# Patient Record
Sex: FEMALE | URBAN - METROPOLITAN AREA
[De-identification: names, ages, dates, MRNs, and addresses within clinical notes are randomized per-mention and may not be internally consistent; named-entity substitution may affect disease eponyms.]

---

## 2018-08-06 LAB — MAMMOGRAM: NORMAL

## 2019-04-09 ENCOUNTER — TRANSFERRED RECORDS (OUTPATIENT)
Dept: HEALTH INFORMATION MANAGEMENT | Facility: CLINIC | Age: 59
End: 2019-04-09

## 2019-04-24 ENCOUNTER — DOCUMENTATION ONLY (OUTPATIENT)
Dept: CARE COORDINATION | Facility: CLINIC | Age: 59
End: 2019-04-24

## 2019-05-07 NOTE — TELEPHONE ENCOUNTER
FUTURE VISIT INFORMATION      FUTURE VISIT INFORMATION:    Date: 6/24/19    Time: 12:50pm    Location: [facility]  REFERRAL INFORMATION:    Referring provider:  Kyler Kelly DDS    Referring providers clinic:  [facility]    Reason for visit/diagnosis:  Pt has squeezing feeling in her throat. Complains of choking sensations, sand in her throat and has to clear throat by coughing. Addressing myofascial pain through PT and Health psych possibly contributing to her symptoms.    RECORDS REQUESTED FROM:       Clinic name Comments Records Status Imaging Status   [facility] 1/29/19 - Office Visit - Kyler Kelly DDS Received / Epic          [facility] 2/10/16 - Office Visit - Dr. Philip Ramirez Care Everywhere    [facility] - Imaging 3/6/18 - US Head Neck Soft Tissue  1/6/16 - US Head Neck Soft Tissue  10/22/14 - US Head Neck Soft Tissue Care Everywhere PACS   [facility] - Imaging 7/23/18 - XR Chest  5/1/17 - XR Chest  11/11/15 CT Temporal bone   Care Everywhere req 6/18 - in PACS 6/19 5/7/19 10:50am - Called [facility] and requested records (appt notes, last therapy notes).  They will fax the recs they have to us.  PP  5/7/19 11:08am - Called pt and LVM inquiring about out side recs. Left call back number.  PP  5/7/19 11:11am - Faxed imaging request to [facility].  PP  5/7/19 11:13am - Faxed imaging request to [facility].  PP  5/7/19 12:10pm - Mili received phone call from [facility] - they did not do CT Neck imaging there.  PP  5/7/19 4:50pm - Received fax of recs from [facility]. Faxed to [facility] for chart.  PP  5/14/19 9:41am - Received imaging report from [facility] via fax and resolved imaging in PACS.  PP  5/14/19 9:50am - Faxed imaging request for chest images to [facility].  PP  6/18/19 1:46PM sent a request to [facility] for images - [facility]   6/19/19 6:53PM received image from [facility] in PACS - [facility]

## 2019-06-24 ENCOUNTER — PRE VISIT (OUTPATIENT)
Dept: OTOLARYNGOLOGY | Facility: CLINIC | Age: 59
End: 2019-06-24

## 2019-06-24 ENCOUNTER — OFFICE VISIT (OUTPATIENT)
Dept: OTOLARYNGOLOGY | Facility: CLINIC | Age: 59
End: 2019-06-24
Payer: COMMERCIAL

## 2019-06-24 DIAGNOSIS — R05.3 CHRONIC COUGH: ICD-10-CM

## 2019-06-24 DIAGNOSIS — R09.A2 GLOBUS SENSATION: Primary | ICD-10-CM

## 2019-06-24 DIAGNOSIS — R49.0 DYSPHONIA: ICD-10-CM

## 2019-06-24 DIAGNOSIS — R13.19 OTHER DYSPHAGIA: ICD-10-CM

## 2019-06-24 DIAGNOSIS — R07.0 THROAT DISCOMFORT: ICD-10-CM

## 2019-06-24 DIAGNOSIS — J38.7 IRRITABLE LARYNX SYNDROME: ICD-10-CM

## 2019-06-24 NOTE — LETTER
6/24/2019      RE: Nadia Toro  [address]  [place] [address]       Dear Dr. Kelly:    I had the pleasure of meeting Nadia Toro in consultation at the [facility] of the [facility] at your request, for evaluation of throat irritation. The note from our visit follows. Speech recognition software may have been used in the documentation below; input is reviewed before signature to the best of my ability. I appreciate the opportunity to participate in the care of this pleasant patient.    Please feel free to contact me with any questions.    Sincerely yours,    Dunia Ramos M.D., M.P.H.  , Laryngology  Director, [facility]  Otolaryngology- Head & Neck Surgery  [phone]          =====    History of Present Illness:   Nadia Toro is a pleasant 58-year-old female who presents with a four year history of chronic cough and throat concerns. Symptoms include throat irritation, throat tightness, pain/ache in throat and frequent cough.    The patient describes a sensation of sandpaper in the left throat which is the worst of the problems she is experiencing. It feels burning, sharp, or like something is stuck or her throat on the left side is squeezing. She believes this started when she was having issues with fungal infections in her left ear. Now her ear issues have cleared up, but the throat issue remains.     The sensation causes her to cough when she swallows her saliva or makes the motion of swallowing, but not when eating food or talking. She is now bothered by perfumes and scents which make her cough, and this was not the case prior to this throat issue. She also feels throat tightness and like she has extra mucous in her throat. Rarely she has had issues with food sticking for the first few swallows in the am.  No issues with breathing. Sometimes she feels like her throat clears are not  productive, and she does have a history of GERD in the past. She was seen by an outside ENT and sent to dental for TMJ problems. The exercises from TMJ clinic did somewhat alleviate the sensation in the left throat, but it is still there and is a daily struggle.    The patient also thinks she has a lump on the left side near the angle of her jaw and just medial to this, within the pharynx is where she has the discomfort. This lump has not changed since 2015. She had a CT scan in 2015 while she had the lump, and she understood that it was unremarkable. Of note, she has also been through PT/OT for tension in her neck and back muscles, and reports that she understands that anxiety and stress can be related to a lot of muscle issues.     She notes pain at thyrohyoid space.      Voice  No concerns.       Swallowing  Swallow problem began in October of 2015 in the context of external otitis and myringitis around the same time.  In the past, she has had difficulty with esophageal stasis and GERD but not much lately. Eliminating gluten helped.  Things never feel like they get down the wrong tube.    Food and liquid go down without difficulty, but sometimes with swallowing saliva she feels a tight sensation on the left side of her throat. Sometimes it also feels like there is something sharp in her throat that makes her cough, and at times her left ear feels bothered. feels like sandpaper in her throat.    She does not experience increased swallowing effort with any texture(s).    She was seen by an ENT and referred for TMJ evaluation. She has also been seen for TMJ dysfunction and had physical therapy for myofascial pain of the masticatory and cervical muscles, but her swallowing still feels tight. No prior swallow study. She also did acupuncture as well as chiropractic medicine.      Cough/Throat-clearing  As noted above, she sometimes coughs when she gets throat discomfort on the left side.  The cough is generally worse  in the mornings and does seem to be better over time. No preceding tickle, but perfumes and strong smells also trigger a cough.      Breathing  No concerns.       Throat discomfort  As above.      Reflux-type symptoms  She experiences heartburn/indigestion rarely. She is not taking reflux medications.  No prior Gastroenterology evaluation.      Prior [facility] outside records were reviewed for this visit. She was previously followed with Dr. Osiel Ramirez for ear symptoms. She also saw Dr. Faisal Corrigan for throat symptoms. Notes were not available for review.      MEDICATIONS:     Current Outpatient Medications   Medication Sig Dispense Refill     metroNIDAZOLE (METROCREAM) 0.75 % external cream          ALLERGIES:  No Known Allergies    PAST MEDICAL HISTORY: No past medical history on file.   Endometriosis       Pelvic pain       Non-seasonal allergic rhinitis 4/12/2010     Anxiety 4/12/2010     Hemangioma of liver 4/12/2010     Thyroid nodule 4/12/2010 Followed at [place]   Endometriosis, site unspecified 4/12/2010     Vitamin D deficiency 12/16/2011     Esophageal reflux 12/16/2011           PAST SURGICAL HISTORY: No past surgical history on file.  Surgery Date Site/Laterality Comments   PELVIC LAPAROSCOPY          [facility] TUBAL LIGATION          IL REMOVAL OF OVARY(S)     left     chest wall lipoma excision     left         HABITS/SOCIAL HISTORY:    Social History     Tobacco Use     Smoking status: Not on file   Substance Use Topics     Alcohol use: Not on file     Tobacco Use Types Packs/Day Years Used Date   Never Smoker           Smokeless Tobacco: Never Used           Tobacco Cessation: Counseling Given: Yes     Alcohol Use Drinks/Week oz/Week Comments   Yes     social           FAMILY HISTORY:  No family history on file. Noncontributory.    REVIEW OF SYSTEMS:  The patient completed a comprehensive 11 point review of systems (below), which was reviewed. Positives are as noted below; pertinent findings are as noted in  the HPI.     Patient Supplied Answers to Review of Systems   ENT ROS 6/23/2019   Ears, Nose, Throat Ringing/noise in ears, Trouble swallowing   Musculoskeletal Neck pain       PHYSICAL EXAMINATION:  General: The patient was alert and oriented x 3 and conversant, and in no acute distress, but speaks quickly, has somewhat tangential speech and is obviously anxious.   Eyes: EOM normal, Conjunctiva and lids normal.  Nose: Anterior rhinoscopy: no gross abnormalities. no  bleeding; no  mucopurulence; septum grossly normal, no  mucoid drainage and/or crusting.  Oral cavity/oropharynx: No masses or lesions. Dentition in  good condition. Floor of mouth and oral tongue soft to palpation. Tongue mobility and palate elevation intact and symmetric.  Ears: Normal auricles, external auditory canals bilaterally. Visualized portions of tympanic membranes normal bilaterally.   Neck: No palpable cervical lymphadenopathy. There was moderate tenderness to palpation of the thyrohyoid space, which was narrow. Landmarks are easily palpable. No discrete mass in the area of the left parotid, but some possible increased soft tissue density in that region.  Resp: Breathing comfortably, no stridor or stertor  Neuro: Symmetric facial function. Other cranial nerves as documented above.  Psych: Normal affect, pleasant and cooperative.  Voice/speech:  No dysphonia  Extremities: No cyanosis, clubbing, or edema of the upper extremities.     Intake scores  Total Score for Last Patient-Answered VHI Questionnaire  VHI Total Score 6/23/2019   VHI Total Score 0     Total Score for Last Patient-Answered EAT Questionnaire  EAT Total Score 6/23/2019   EAT Total Score 0       Total Score for Last Patient-Answered CSI Questionnaire  CSI Total Score 6/23/2019   CSI Total Score 0       PROCEDURE:   Flexible fiberoptic laryngoscopy  Indications: This procedure was warranted to evaluate the patient's laryngeal anatomy and function. Risks, benefits, and  "alternatives were discussed.  Description: After written informed consent was obtained, a time-out was performed to confirm patient identity, procedure, and procedure site. Topical 3% lidocaine with 0.25% phenylephrine was applied to the nasal cavities. I performed the endoscopy and no complications were apparent.  Performed by: Dunia Ramos MD MPH  SLP: Everton Martines MM, MA, CCC-SLP   Findings: Normal nasopharynx. Normal base of tongue, valleculae, and epiglottis. The right true vocal fold demonstrated normal mobility. The left true vocal fold demonstrated normal mobility. The medial edges of the vocal folds appeared smooth and straight. No focal mucosal lesions were observed on the true vocal folds. Glissade produced appropriate elongation.  Mucosa of the false vocal folds, aryepiglottic folds, piriform sinuses, and posterior glottis unremarkable. Airway was patent. Response to the therapy probes was good. No lesions on NBI.    LABS:  None    IMAGING/RESULTS reviewed, with pertinent report excerpts below:  [facility] CT neck with contrast from 2015 not available to view but copies of the report reads:  \"mucosal surfaces of the upper aerodigestive tract are symmetrical and unremarkable, without discrete mass. The oral cavity and floor of mouth structures are symmetrical and unremarkable. No parapharyngeal inflammation or infiltration. The parotid and submandibular glands are symmetrical and unremarkable. Small bilateral cervical lymph nodes are noted; none are pathologic by size or morphology.\"       IMPRESSION AND PLAN:   Nadia Toro presents with hypersensitivity of the upper aerodigestive tract without any focal exam abnormalities.    Discussion:  1) We discussed CT scan to further investigate the deep tissues of her neck. She previously had a CT in 2015 (which was indicated for the same symptoms that she comes in for today) for which she had copies of the reads, and these describe no " mass or abnormalities in the area. She reports that her symptoms have not changed much sine then, but we did discuss consideration of a repeat study given her ongoing concern about a possible mass in the area of her left parotid gland. She will think about this and let us know if she would like to proceed.    2) We also discussed the use of neuromodulatory medications and their side effect profiles. She is not interested in these medications at this time because of potential sedation.     3) We discussed speech therapy to help with muscle tension and cough suppression. She would like to proceed with this. She has previously found physical therapy/myofascial release helpful and therefore feels hopeful about this option.    4) We also discussed that we did not evaluate her esophagus today and esophagoscopy would be reasonable to consider if her symptoms persist or worsen. She has already been seen at [facility] (but no prior upper endoscopy) so no additional referral was given, but can be provided if that would be helpful.    She will return as needed. I appreciate the opportunity to participate in the care of this patient.       Seen with:   Afsaneh Carrero MD  [facility]    Physician Attestation   I, Dunia Ramos, saw this patient and agree with the findings and plan of care as documented in the edited note above.      Items personally reviewed/procedural attestation: I was present for and supervised the entire laryngoscopy procedure.    Dunia Ramos MD

## 2019-06-24 NOTE — PROGRESS NOTES
"[facility]  Evaluation report    Clinician: Everton Martines M.M., M.A., CCC/SLP  Seen in conjunction with: Dr. Ramos  Referring physician:  Dr. Kelly  Patient: Nadia Toro  Date of Visit: 6/24/2019    HISTORY  Chief complaint: Nadia Toro is a 58 year old woman presenting today for evaluation of swallowing, cough, and throat discomfort.    Onset: Suddenly in the fall of 2015  Inciting incident: unclear but it began subsequent to an ear infection  Course: Stable  Salient history: She has a history significant for thyroid nodules, TMD.  Was seen by an outside ENT who felt that her symptoms were related to TMD, and referred her to the TMD clinic here at the .  Her she reports trying to avoid medications and possible and she feels that her work with the TMD clinic, physical therapy, and acupuncture have been helpful for reducing the globus sensation; however, it still continues to persist to some degree and this \"drives her crazy\".    CURRENT SYMPTOMS INCLUDE    COUGH/THROAT CLEARING    Worse in the mornings    Associates this tight feeling in her left neck area and possible reflux    Feels as if something is blocked on the left side of her throat and she must cough in order to clear it    Cough is intermittently productive with small amounts of mucus coming up with significant cough    her cough/ throat clearing triggers include:  o Perfumes and strong smells    SWALLOWING    She notes she has had occasional issues with sensation of solid food sticking in her esophagus, but this is not a major concern    When she swallows her saliva she describes a \"squeezing tight feeling on the left side of her throat\" and intermittent sensation of a choking feeling at various times throughout her day    She notes that for the past 3 months she has had a sensation of saliva feeling like it \"goes down the wrong tube\" with resultant cough    ADDITIONAL    THROAT DISCOMFORT    Sensation of something sharp " "stuck in her throat    Sensation of numbness of the back of her tongue    Tongue movement will result in a tickly feeling in her left ear    Patient denies significant dyspnea and dysphonia.     OTHER PERTINENT HISTORY    Otherwise unknown.  Please also refer to Dr. Ramos's dictation.     No past medical history on file.  No past surgical history on file.    OBJECTIVE  PATIENT REPORTED MEASURES  Patient Supplied Answers To VHI Questionnaire  Voice Handicap Index (VHI-10) 6/23/2019   My voice makes it difficult for people to hear me 0   People have difficulty understanding me in a noisy room 0   My voice difficulties restrict my personal and social life.  0   I feel left out of conversations because of my voice 0   My voice problem causes me to lose income 0   I feel as though I have to strain to produce voice 0   The clarity of my voice is unpredictable 0   My voice problem upsets me 0   My voice makes me feel handicapped 0   People ask, \"What's wrong with your voice?\" 0   VHI-10 0     Patient Supplied Answers To CSI Questionnaire  Cough Severity Index (CSI) 6/23/2019   My cough is worse when I lie down 0   My coughing problem causes me to restrict my personal and social life 0   I tend to avoid places because of my cough problem 0   I feel embarrassed because of my coughing problem 0   People ask, ''What's wrong?'' because I cough a lot 0   I run out of air when I cough 0   My coughing problem affects my voice 0   My coughing problem limits my physical activity 0   My coughing problem upsets me 0   People ask me if I am sick because I cough a lot 0   CSI Score 0     Patient Supplied Answers To EAT Questionnaire  Eating Assessment Tool (EAT-10) 6/23/2019   My swallowing problem has caused me to lose weight 0   My swallowing problem interferes with my ability to go out for meals 0   Swallowing liquids takes extra effort 0   Swallowing solids takes extra effort 0   Swallowing pills takes extra effort 0   Swallowing " is painful 0   The pleasure of eating is affected by my swallowing 0   When I swallow food sticks in my throat 0   I cough when I eat 0   Swallowing is stressful 0   EAT-10 0     PERCEPTUAL EVALUATION (CPT 45152)  POSTURE / TENSION:     neck and shoulders    BREATHING:     phonation is not coordinated with respiration    LARYNGEAL PALPATION:     tenderness of the thyrohyoid area    reduced thyrohyoid space     Patient states that palpation of the thyrohyoid space is consistent with some aspects of her throat discomfort/globus    VOICE:    Roughness: Minimal    Breathiness: Moderate Intermittent    Strain: Mild to moderate Intermittent    Loudness    Conversational speech:  WNL    Pitch:    Conversational speech:  WNL    Pitch glide:     Significant and coordination during the exam; however, following clinician cue patient was able to demonstrate adequate access to loft register with no notable breaks    Resonance:    Conversational speech:  WNL    CAPE-V Overall Severity:  8/100 (baseline) 24/100 (during laryngeal evaluation)    COUGH/THROAT CLEARING:    Not observed    ____________________________________________________________________  Laryngeal Function Studies   laryngeal function studies were not warranted at this time given limited patient concern with voice quality.  ____________________________________________________________________    LARYNGEAL EXAMINATION  Procedure: Flexible endoscopy with chip-tip technology without stroboscopy, right nostril; topical anesthesia with 3% Lidocaine and 0.25% phenylephrine was applied.   Performed by: Dr. Ramos   The laryngeal and pharyngeal structures were evaluated for gross appearance, mobility, function, and focal lesions / abnormalities of the associated mucosa.    All findings were within normal limits with the exception of the following salient features:     There is no significant pooling or thickened secretions present throughout the supraglottis to account for  patient's productive cough    No focal lesions or abnormalities or areas of irritation or notable    Throughout the laryngeal evaluation patient was significantly discoordinated in terms of laryngeal movement, and a market increase in dysphonia was seen    With phonatory tasks there is significant supraglottic hyperfunction, corresponding to areas of tenderness on laryngeal palpation    Decreased hyperfunction is noted with forward resonant stimuli and coordination of phonation and respiration    The laryngeal exam was reviewed with Ms. Toro, and I provided pertinent explanations, as well as written and oral information.    ASSESSMENT / PLAN  IMPRESSIONS: Nadia Toro is presenting today with F45.8 (Globus Sensation) and R07.0 (Throat Pain) in the context of J38.7 (Irritable Larynx Syndrome) and an imbalance in function of the intrinsic and extrinsic muscles of the larynx.  Laryngeal evaluation demonstrates essentially healthy laryngeal and vocal fold mucosa without focal lesions or abnormalities, but significant discoordination of laryngeal movement and associated increase in dysphonia (R 49.0).  Laryngeal hyperfunction visualized on exam corresponds to areas of tenderness on palpation and patient reported symptoms of throat discomfort and globus.    STIMULABILITY: results of therapy probes during perceptual and laryngeal evaluation demonstrate improvement with use of forward resonant stimuli and coordination of respiration and phonation    RECOMMENDATIONS:     Multiple options were discussed with the patient including:    repeat CT scan given that her last testing was 4 years previous to rule out any deep tissue abnormalities    the use of neuromodulator medications to reduce laryngeal and pharyngeal hypersensitivity, but patient would like to avoid this avenue at this time, and this was seconded by the care team    A course of speech therapy is recommended to promote reduced discomfort, effort and  fatigue and help reduce Impactful laryngeal behaviors and laryngeal and perilaryngeal tension which contribute to her globus sensation and throat discomfort.    At this time patient wishes to proceed with speech therapy, given her gains made via other muscular and behavioral-based intervention.    She demonstrates a Good prognosis for improvement given adherence to therapeutic recommendations.     Positive indicators: positive response to therapy probes diagnosis is known to respond to treatment previously positive response to behavioral therapy    Negative indicators: None    DURATION / FREQUENCY: 4 biweekly and 2 monthly one-hour sessions    GOALS:  Patient goal:   1. To understand the problem and fix it as much as possible    Short-term goal(s): Within the first 4 sessions, Ms. Toro:  1. will demonstrate assigned laryngeal massage techniques with 80% accuracy or better with no clinician support  2. will demonstrate improved awareness of throat clearing / cough: acknowledging >75% of all cough events during session time with no clinician support  3. will be able to independently list key factors in maintenance of good vocal hygiene with 80% accuracy, and report on their use outside the therapy room.  4. will utilize silent inhalation with good low-respiratory engagement 75% of the time during therapy tasks with minimal clinician support  5. will demonstrate semi-occluded vocal tract (SOVT) exercises with at least 80% accuracy with no clinician support  6. will accurately identify target vs. habitual voice quality during therapy tasks in 4 out of 5 trials with no clinician support  7. will demonstrate the ability to alternate between target and habitual voice quality given clinician cue 75% of the time during therapy tasks    Long-term goal(s): In 6 months, Ms. Toro will:  1. Report a week of typical activities, in which Globus sensation/throat discomfort does not exceed a level of 2 out of 10, 80% of  the time    This treatment plan was developed with the patient who agreed with the recommendations.      TOTAL SERVICE TIME: 60 minutes  EVALUATION OF VOICE AND RESONANCE (27152)  NO CHARGE FACILITY FEE (53236)    Everton Martines M.M., M.A., CCC-SLP  Speech-Language Pathologist  Certificate of Vocology  [phone]    *this report was created in part through the use of computerized dictation software, and though reviewed following completion, some typographic errors may persist.  If there is confusion regarding any of this notes contents, please contact me for clarification.*

## 2019-06-24 NOTE — PROGRESS NOTES
Dear Dr. Kelly:    I had the pleasure of meeting Nadia Toro in consultation at the [facility] of the [facility] at your request, for evaluation of throat irritation. The note from our visit follows. Speech recognition software may have been used in the documentation below; input is reviewed before signature to the best of my ability. I appreciate the opportunity to participate in the care of this pleasant patient.    Please feel free to contact me with any questions.    Sincerely yours,    Dunia Ramos M.D., M.P.H.  , Laryngology  Director, [facility]  Otolaryngology- Head & Neck Surgery  [phone]          =====    History of Present Illness:   Nadia Toro is a pleasant 58-year-old female who presents with a four year history of chronic cough and throat concerns. Symptoms include throat irritation, throat tightness, pain/ache in throat and frequent cough.    The patient describes a sensation of sandpaper in the left throat which is the worst of the problems she is experiencing. It feels burning, sharp, or like something is stuck or her throat on the left side is squeezing. She believes this started when she was having issues with fungal infections in her left ear. Now her ear issues have cleared up, but the throat issue remains.     The sensation causes her to cough when she swallows her saliva or makes the motion of swallowing, but not when eating food or talking. She is now bothered by perfumes and scents which make her cough, and this was not the case prior to this throat issue. She also feels throat tightness and like she has extra mucous in her throat. Rarely she has had issues with food sticking for the first few swallows in the am.  No issues with breathing. Sometimes she feels like her throat clears are not productive, and she does have a history of GERD in the past. She was seen by an  outside ENT and sent to dental for TMJ problems. The exercises from TMJ clinic did somewhat alleviate the sensation in the left throat, but it is still there and is a daily struggle.    The patient also thinks she has a lump on the left side near the angle of her jaw and just medial to this, within the pharynx is where she has the discomfort. This lump has not changed since 2015. She had a CT scan in 2015 while she had the lump, and she understood that it was unremarkable. Of note, she has also been through PT/OT for tension in her neck and back muscles, and reports that she understands that anxiety and stress can be related to a lot of muscle issues.     She notes pain at thyrohyoid space.      Voice  No concerns.       Swallowing  Swallow problem began in October of 2015 in the context of external otitis and myringitis around the same time.  In the past, she has had difficulty with esophageal stasis and GERD but not much lately. Eliminating gluten helped.  Things never feel like they get down the wrong tube.    Food and liquid go down without difficulty, but sometimes with swallowing saliva she feels a tight sensation on the left side of her throat. Sometimes it also feels like there is something sharp in her throat that makes her cough, and at times her left ear feels bothered. feels like sandpaper in her throat.    She does not experience increased swallowing effort with any texture(s).    She was seen by an ENT and referred for TMJ evaluation. She has also been seen for TMJ dysfunction and had physical therapy for myofascial pain of the masticatory and cervical muscles, but her swallowing still feels tight. No prior swallow study. She also did acupuncture as well as chiropractic medicine.      Cough/Throat-clearing  As noted above, she sometimes coughs when she gets throat discomfort on the left side.  The cough is generally worse in the mornings and does seem to be better over time. No preceding tickle, but  perfumes and strong smells also trigger a cough.      Breathing  No concerns.       Throat discomfort  As above.      Reflux-type symptoms  She experiences heartburn/indigestion rarely. She is not taking reflux medications.  No prior Gastroenterology evaluation.      Prior [facility] outside records were reviewed for this visit. She was previously followed with Dr. Osiel Ramirez for ear symptoms. She also saw Dr. Faisal Corrigan for throat symptoms. Notes were not available for review.      MEDICATIONS:     Current Outpatient Medications   Medication Sig Dispense Refill     metroNIDAZOLE (METROCREAM) 0.75 % external cream          ALLERGIES:  No Known Allergies    PAST MEDICAL HISTORY: No past medical history on file.   Endometriosis       Pelvic pain       Non-seasonal allergic rhinitis 4/12/2010     Anxiety 4/12/2010     Hemangioma of liver 4/12/2010     Thyroid nodule 4/12/2010 Followed at [place]   Endometriosis, site unspecified 4/12/2010     Vitamin D deficiency 12/16/2011     Esophageal reflux 12/16/2011           PAST SURGICAL HISTORY: No past surgical history on file.  Surgery Date Site/Laterality Comments   PELVIC LAPAROSCOPY          HCHG TUBAL LIGATION          AR REMOVAL OF OVARY(S)     left     chest wall lipoma excision     left         HABITS/SOCIAL HISTORY:    Social History     Tobacco Use     Smoking status: Not on file   Substance Use Topics     Alcohol use: Not on file     Tobacco Use Types Packs/Day Years Used Date   Never Smoker           Smokeless Tobacco: Never Used           Tobacco Cessation: Counseling Given: Yes     Alcohol Use Drinks/Week oz/Week Comments   Yes     social           FAMILY HISTORY:  No family history on file. Noncontributory.    REVIEW OF SYSTEMS:  The patient completed a comprehensive 11 point review of systems (below), which was reviewed. Positives are as noted below; pertinent findings are as noted in the HPI.     Patient Supplied Answers to Review of Systems  [facility] ROS 6/23/2019    Ears, Nose, Throat Ringing/noise in ears, Trouble swallowing   Musculoskeletal Neck pain       PHYSICAL EXAMINATION:  General: The patient was alert and oriented x 3 and conversant, and in no acute distress, but speaks quickly, has somewhat tangential speech and is obviously anxious.   Eyes: EOM normal, Conjunctiva and lids normal.  Nose: Anterior rhinoscopy: no gross abnormalities. no  bleeding; no  mucopurulence; septum grossly normal, no  mucoid drainage and/or crusting.  Oral cavity/oropharynx: No masses or lesions. Dentition in good condition. Floor of mouth and oral tongue soft to palpation. Tongue mobility and palate elevation intact and symmetric.  Ears: Normal auricles, external auditory canals bilaterally. Visualized portions of tympanic membranes normal bilaterally.   Neck: No palpable cervical lymphadenopathy. There was moderate tenderness to palpation of the thyrohyoid space, which was narrow. Landmarks are easily palpable. No discrete mass in the area of the left parotid, but some possible increased soft tissue density in that region.  Resp: Breathing comfortably, no stridor or stertor  Neuro: Symmetric facial function. Other cranial nerves as documented above.  Psych: Normal affect, pleasant and cooperative.  Voice/speech: No dysphonia  Extremities: No cyanosis, clubbing, or edema of the upper extremities.     Intake scores  Total Score for Last Patient-Answered VHI Questionnaire  VHI Total Score 6/23/2019   VHI Total Score 0     Total Score for Last Patient-Answered EAT Questionnaire  EAT Total Score 6/23/2019   EAT Total Score 0       Total Score for Last Patient-Answered CSI Questionnaire  CSI Total Score 6/23/2019   CSI Total Score 0       PROCEDURE:   Flexible fiberoptic laryngoscopy  Indications: This procedure was warranted to evaluate the patient's laryngeal anatomy and function. Risks, benefits, and alternatives were discussed.  Description: After written informed consent was obtained, a  "time-out was performed to confirm patient identity, procedure, and procedure site. Topical 3% lidocaine with 0.25% phenylephrine was applied to the nasal cavities. I performed the endoscopy and no complications were apparent.  Performed by: Dunia Ramos MD MPH  SLP: Everton Martines MM, MA, CCC-SLP   Findings: Normal nasopharynx. Normal base of tongue, valleculae, and epiglottis. The right true vocal fold demonstrated normal mobility. The left true vocal fold demonstrated normal mobility. The medial edges of the vocal folds appeared smooth and straight. No focal mucosal lesions were observed on the true vocal folds. Glissade produced appropriate elongation.  Mucosa of the false vocal folds, aryepiglottic folds, piriform sinuses, and posterior glottis unremarkable. Airway was patent. Response to the therapy probes was good. No lesions on NBI.    LABS:  None    IMAGING/RESULTS reviewed, with pertinent report excerpts below:  [facility] CT neck with contrast from 2015 not available to view but copies of the report reads:  \"mucosal surfaces of the upper aerodigestive tract are symmetrical and unremarkable, without discrete mass. The oral cavity and floor of mouth structures are symmetrical and unremarkable. No parapharyngeal inflammation or infiltration. The parotid and submandibular glands are symmetrical and unremarkable. Small bilateral cervical lymph nodes are noted; none are pathologic by size or morphology.\"       IMPRESSION AND PLAN:   Nadia Toro presents with hypersensitivity of the upper aerodigestive tract without any focal exam abnormalities.    Discussion:  1) We discussed CT scan to further investigate the deep tissues of her neck. She previously had a CT in 2015 (which was indicated for the same symptoms that she comes in for today) for which she had copies of the reads, and these describe no mass or abnormalities in the area. She reports that her symptoms have not changed much sine " then, but we did discuss consideration of a repeat study given her ongoing concern about a possible mass in the area of her left parotid gland. She will think about this and let us know if she would like to proceed.    2) We also discussed the use of neuromodulatory medications and their side effect profiles. She is not interested in these medications at this time because of potential sedation.     3) We discussed speech therapy to help with muscle tension and cough suppression. She would like to proceed with this. She has previously found physical therapy/myofascial release helpful and therefore feels hopeful about this option.    4) We also discussed that we did not evaluate her esophagus today and esophagoscopy would be reasonable to consider if her symptoms persist or worsen. She has already been seen at [facility] (but no prior upper endoscopy) so no additional referral was given, but can be provided if that would be helpful.    She will return as needed. I appreciate the opportunity to participate in the care of this patient.       Seen with:   Afsaneh Carrero MD  [facility]    Physician Attestation   I, Dunia Ramos, saw this patient and agree with the findings and plan of care as documented in the edited note above.      Items personally reviewed/procedural attestation: I was present for and supervised the entire laryngoscopy procedure.    Dunia Ramos MD

## 2019-06-24 NOTE — LETTER
"6/24/2019       RE: Nadia Toro  [address]  [place] [address]     Dear Colleague,    Thank you for referring your patient, Nadia Toro, to the [facility] VOICE at [facility]. Please see a copy of my visit note below.    [facility] VOICE [facility]INIC  Evaluation report    Clinician: Everton Martines M.M., M.A., CCC/SLP  Seen in conjunction with: Dr. Ramos  Referring physician:  Dr. Kelly  Patient: Nadia Toor  Date of Visit: 6/24/2019    HISTORY  Chief complaint: Nadia Toro is a 58 year old woman presenting today for evaluation of swallowing, cough, and throat discomfort.    Onset: Suddenly in the fall of 2015  Inciting incident: unclear but it began subsequent to an ear infection  Course: Stable  Salient history: She has a history significant for thyroid nodules, TMD.  Was seen by an outside ENT who felt that her symptoms were related to TMD, and referred her to the TMD clinic here at the .  Her she reports trying to avoid medications and possible and she feels that her work with the TMD clinic, physical therapy, and acupuncture have been helpful for reducing the globus sensation; however, it still continues to persist to some degree and this \"drives her crazy\".    CURRENT SYMPTOMS INCLUDE    COUGH/THROAT CLEARING    Worse in the mornings    Associates this tight feeling in her left neck area and possible reflux    Feels as if something is blocked on the left side of her throat and she must cough in order to clear it    Cough is intermittently productive with small amounts of mucus coming up with significant cough    her cough/ throat clearing triggers include:  o Perfumes and strong smells    SWALLOWING    She notes she has had occasional issues with sensation of solid food sticking in her esophagus, but this is not a major concern    When she swallows her saliva she describes a \"squeezing tight feeling on the left side of her throat\" and " "intermittent sensation of a choking feeling at various times throughout her day    She notes that for the past 3 months she has had a sensation of saliva feeling like it \"goes down the wrong tube\" with resultant cough    ADDITIONAL    THROAT DISCOMFORT    Sensation of something sharp stuck in her throat    Sensation of numbness of the back of her tongue    Tongue movement will result in a tickly feeling in her left ear    Patient denies significant dyspnea and dysphonia.     OTHER PERTINENT HISTORY    Otherwise unknown.  Please also refer to Dr. Ramos's dictation.     No past medical history on file.  No past surgical history on file.    OBJECTIVE  PATIENT REPORTED MEASURES  Patient Supplied Answers To VHI Questionnaire  Voice Handicap Index (VHI-10) 6/23/2019   My voice makes it difficult for people to hear me 0   People have difficulty understanding me in a noisy room 0   My voice difficulties restrict my personal and social life.  0   I feel left out of conversations because of my voice 0   My voice problem causes me to lose income 0   I feel as though I have to strain to produce voice 0   The clarity of my voice is unpredictable 0   My voice problem upsets me 0   My voice makes me feel handicapped 0   People ask, \"What's wrong with your voice?\" 0   VHI-10 0     Patient Supplied Answers To CSI Questionnaire  Cough Severity Index (CSI) 6/23/2019   My cough is worse when I lie down 0   My coughing problem causes me to restrict my personal and social life 0   I tend to avoid places because of my cough problem 0   I feel embarrassed because of my coughing problem 0   People ask, ''What's wrong?'' because I cough a lot 0   I run out of air when I cough 0   My coughing problem affects my voice 0   My coughing problem limits my physical activity 0   My coughing problem upsets me 0   People ask me if I am sick because I cough a lot 0   CSI Score 0     Patient Supplied Answers To EAT Questionnaire  Eating Assessment Tool " (EAT-10) 6/23/2019   My swallowing problem has caused me to lose weight 0   My swallowing problem interferes with my ability to go out for meals 0   Swallowing liquids takes extra effort 0   Swallowing solids takes extra effort 0   Swallowing pills takes extra effort 0   Swallowing is painful 0   The pleasure of eating is affected by my swallowing 0   When I swallow food sticks in my throat 0   I cough when I eat 0   Swallowing is stressful 0   EAT-10 0     PERCEPTUAL EVALUATION (CPT 01496)  POSTURE / TENSION:     neck and shoulders    BREATHING:     phonation is not coordinated with respiration    LARYNGEAL PALPATION:     tenderness of the thyrohyoid area    reduced thyrohyoid space     Patient states that palpation of the thyrohyoid space is consistent with some aspects of her throat discomfort/globus    VOICE:    Roughness: Minimal    Breathiness: Moderate Intermittent    Strain: Mild to moderate Intermittent    Loudness    Conversational speech:  WNL    Pitch:    Conversational speech:  WNL    Pitch glide:     Significant and coordination during the exam; however, following clinician cue patient was able to demonstrate adequate access to loft register with no notable breaks    Resonance:    Conversational speech:  WNL    CAPE-V Overall Severity:  8/100 (baseline) 24/100 (during laryngeal evaluation)    COUGH/THROAT CLEARING:    Not observed    ____________________________________________________________________  Laryngeal Function Studies   laryngeal function studies were not warranted at this time given limited patient concern with voice quality.  ____________________________________________________________________    LARYNGEAL EXAMINATION  Procedure: Flexible endoscopy with chip-tip technology without stroboscopy, right nostril; topical anesthesia with 3% Lidocaine and 0.25% phenylephrine was applied.   Performed by: Dr. Ramos   The laryngeal and pharyngeal structures were evaluated for gross appearance,  mobility, function, and focal lesions / abnormalities of the associated mucosa.    All findings were within normal limits with the exception of the following salient features:     There is no significant pooling or thickened secretions present throughout the supraglottis to account for patient's productive cough    No focal lesions or abnormalities or areas of irritation or notable    Throughout the laryngeal evaluation patient was significantly discoordinated in terms of laryngeal movement, and a market increase in dysphonia was seen    With phonatory tasks there is significant supraglottic hyperfunction, corresponding to areas of tenderness on laryngeal palpation    Decreased hyperfunction is noted with forward resonant stimuli and coordination of phonation and respiration    The laryngeal exam was reviewed with Ms. Toro, and I provided pertinent explanations, as well as written and oral information.    ASSESSMENT / PLAN  IMPRESSIONS: Nadia Toro is presenting today with F45.8 (Globus Sensation) and R07.0 (Throat Pain) in the context of J38.7 (Irritable Larynx Syndrome) and an imbalance in function of the intrinsic and extrinsic muscles of the larynx.  Laryngeal evaluation demonstrates essentially healthy laryngeal and vocal fold mucosa without focal lesions or abnormalities, but significant discoordination of laryngeal movement and associated increase in dysphonia (R 49.0).  Laryngeal hyperfunction visualized on exam corresponds to areas of tenderness on palpation and patient reported symptoms of throat discomfort and globus.    STIMULABILITY: results of therapy probes during perceptual and laryngeal evaluation demonstrate improvement with use of forward resonant stimuli and coordination of respiration and phonation    RECOMMENDATIONS:     Multiple options were discussed with the patient including:    repeat CT scan given that her last testing was 4 years previous to rule out any deep tissue  abnormalities    the use of neuromodulator medications to reduce laryngeal and pharyngeal hypersensitivity, but patient would like to avoid this avenue at this time, and this was seconded by the care team    A course of speech therapy is recommended to promote reduced discomfort, effort and fatigue and help reduce Impactful laryngeal behaviors and laryngeal and perilaryngeal tension which contribute to her globus sensation and throat discomfort.    At this time patient wishes to proceed with speech therapy, given her gains made via other muscular and behavioral-based intervention.    She demonstrates a Good prognosis for improvement given adherence to therapeutic recommendations.     Positive indicators: positive response to therapy probes diagnosis is known to respond to treatment previously positive response to behavioral therapy    Negative indicators: None    DURATION / FREQUENCY: 4 biweekly and 2 monthly one-hour sessions    GOALS:  Patient goal:   1. To understand the problem and fix it as much as possible    Short-term goal(s): Within the first 4 sessions, Ms. Toro:  1. will demonstrate assigned laryngeal massage techniques with 80% accuracy or better with no clinician support  2. will demonstrate improved awareness of throat clearing / cough: acknowledging >75% of all cough events during session time with no clinician support  3. will be able to independently list key factors in maintenance of good vocal hygiene with 80% accuracy, and report on their use outside the therapy room.  4. will utilize silent inhalation with good low-respiratory engagement 75% of the time during therapy tasks with minimal clinician support  5. will demonstrate semi-occluded vocal tract (SOVT) exercises with at least 80% accuracy with no clinician support  6. will accurately identify target vs. habitual voice quality during therapy tasks in 4 out of 5 trials with no clinician support  7. will demonstrate the ability to  alternate between target and habitual voice quality given clinician cue 75% of the time during therapy tasks    Long-term goal(s): In 6 months, Ms. Toro will:  1. Report a week of typical activities, in which Globus sensation/throat discomfort does not exceed a level of 2 out of 10, 80% of the time    This treatment plan was developed with the patient who agreed with the recommendations.      TOTAL SERVICE TIME: 60 minutes  EVALUATION OF VOICE AND RESONANCE (23307)  NO CHARGE FACILITY FEE (48434)    Everton Martines M.M., M.A., CCC-SLP  Speech-Language Pathologist  Certificate of Vocology  [phone]    *this report was created in part through the use of computerized dictation software, and though reviewed following completion, some typographic errors may persist.  If there is confusion regarding any of this notes contents, please contact me for clarification.*      Again, thank you for allowing me to participate in the care of your patient.      Sincerely,    Ayo Martines, SLP

## 2019-06-26 ENCOUNTER — OFFICE VISIT (OUTPATIENT)
Dept: OTOLARYNGOLOGY | Facility: CLINIC | Age: 59
End: 2019-06-26
Payer: COMMERCIAL

## 2019-06-26 DIAGNOSIS — R07.0 THROAT DISCOMFORT: ICD-10-CM

## 2019-06-26 DIAGNOSIS — J38.7 IRRITABLE LARYNX SYNDROME: ICD-10-CM

## 2019-06-26 DIAGNOSIS — R09.A2 GLOBUS SENSATION: ICD-10-CM

## 2019-06-26 DIAGNOSIS — R49.0 DYSPHONIA: Primary | ICD-10-CM

## 2019-06-26 NOTE — PATIENT INSTRUCTIONS
"After Visit Summary    Patient: Nadia Toro  Date of Visit: 6/26/2019    Rachana Duyen  [phone]  All new clients have a complimentary 20-25 min phone consultation prior to their first appointment. We will discuss the client's physical health goals and determine if [facility] is a good fit. If so, a session length recommendation will be made which will be scheduled from there.   You can schedule this as a free appointment through the online calendar or contact me directly, whichever you prefer. :)   Feel free to call/email directly:    [phone]      [email or web address]  (* muscle tension dysphonia, and globus sensation; Nica will send a letter)    Hygiene: We will learn next time    Relieving Extrinsic Muscle Discomfort:    Stretches: next time    Base of tongue stretches:   2-3x/day: with the exercises on the sheet   And also, try the [place] passage 2x/day (less helpful for stretching, but try)      Massage  o Please follow instructions as learned today and provided on handout  o And add a warm compress  o Call Rachana.    Cough Suppression:    Sip of water     Gargle    Swallow    Suck on a lozenge with Pectin (avoid mint or menthol) or a sugar-free candy, gum, \"wet\" snacks (apples, pineapple, grapes, etc).  Also consider Xylitol products, like the Spry brand of lozenges, gum, spray    massage    Swallowing: easy chin tuck, coordinating breath with your swallow.    Nica Gaming M.M. (voice), M.A., CCC/SLP  Speech-Language Pathologist  Certificate of Vocology  [facility]  [phone]  [email or web address]  Prounouns: she/her      "

## 2019-06-26 NOTE — LETTER
Date:July 5, 2019      Patient was self referred, no letter generated. Do not send.        [facility] Information
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
## 2019-07-03 NOTE — PROGRESS NOTES
[facility]  Yoshi Cruz Jr., M.D., F.A.C.S.  Dunia Ramos M.D., M.P.H.  Lizz Gerber, Ph.D., CCC/SLP  Nica Gaming M.M. (voice), M.A., CCC/SLP  Ayo Martines M.M. (voice), M.A., CCC/SLP    Date of Visit: 6/26/19    To: Rachana Geller, Clinical Massage Therapist - [place]    Patient: Nadia Toro  Referring provider: Nica Gaming M.M. (voice), M.A., CCC/SLP    Dear Ms. Zay,    This letter is in support of accommodation for my patient, Nadia Toro, who  is under the care of  Dr. Ramos and myself at [facility] in the Department of Otolaryngology.  Nadia Toro has been under our care since 6/24/19 for muscle tension symptoms, and was diagnosed with R49.0 (Dysphonia), F45.8 (Globus Sensation), R07.0 (Throat Pain) and J38.7 (Irritable Larynx Syndrome) .    To appropriately treat this condition I believe it is medically necessary that Nadia Toro :    Complete clinical massage therapy, including fascial release work to all muscles/ structures of the upper body and neck that would be involved in speaking, breathing, and cough.  Clinical massage therapy should initially include 4-6 (additional as warranted) sessions over the next 6 months.      Continue to receive rehabilitative speech therapy services (CPT Code 69954).  This course of treatment will focus on strategies to:    improve vocal efficiency     decrease laryngeal irritability    Learn strategies to improve coordination of respiratory mechanics and phonation    Learn strategies to resolve severe chronic cough symptoms.    If you have any questions, please do not hesitate to contact us at [phone].    Sincerely,   Nica Gaming M.M. (voice), M.A., CCC/SLP  Speech-Language Pathologist  [facility]  [phone]

## 2019-07-15 ENCOUNTER — OFFICE VISIT (OUTPATIENT)
Dept: OTOLARYNGOLOGY | Facility: CLINIC | Age: 59
End: 2019-07-15
Payer: COMMERCIAL

## 2019-07-15 DIAGNOSIS — R09.A2 GLOBUS SENSATION: ICD-10-CM

## 2019-07-15 DIAGNOSIS — R49.0 DYSPHONIA: Primary | ICD-10-CM

## 2019-07-15 DIAGNOSIS — J38.7 IRRITABLE LARYNX SYNDROME: ICD-10-CM

## 2019-07-15 DIAGNOSIS — R07.0 THROAT DISCOMFORT: ICD-10-CM

## 2019-07-15 NOTE — LETTER
Date:July 16, 2019      Patient was self referred, no letter generated. Do not send.        [facility] Information

## 2019-07-15 NOTE — PATIENT INSTRUCTIONS
"After Visit Summary    Patient: Nadia Toro  Date of Visit: 7/15/2019    Hygiene:     Topical Hydration: hydration for the surface mucosa of the larynx and vocal folds.    Please follow strategies learned on the \"Tips for Topical Hydration\" handout    Nasal Irrigation/Nasal Spray  o 3 puffs in each nostril; sniff and then blow your nose     Gargle  o Saline AM and PM; plain water throughout the day  o With a voice  o Tilt your head side to side  o [place] chirp -  kakakaaa kakakaaa     Relieving Extrinsic Muscle Discomfort:    Stretches  o Please follow instructions as learned today and provided on handout  o 7-way neck stretch      Massage  o Please follow instructions as learned today and provided on handout  o Continue these as well, and also with Rachana     Breathing:    Breathing Tips:  o Breathe in through rounded lips   o Keep shoulders down and chest relaxed    Voice (2-3x/day unless otherwise noted):    Semi-occluded vocal tract exercise:   o Bubbles (straw in 1 to 1.5  of water) 4-6x/day for 30-60 sec:  o 3x: blow bubbles and add a sustained  who  or an  oo  (comfortable pitch)  o 3x: blow bubbles and vary  who  gliding up and down             Up and down like a sine wave  o 3x: blow bubbles on a sustained/ varied pitch soft to loud to soft (messa di voce)    These exercises are great for:    *warm up / cool down - Part of the morning routing and before and after extended voice use.    *tissue mobilization exercise - Improving the condition and pliability of the vocal folds.    *Abdominal breathing and applying optimal breath flow to speech/singing.     Next: gag suppression    Nica Gaming M.M. (voice), M.A., CCC/SLP  Speech-Language Pathologist  Certificate of Vocology  [facility]  [phone]  [email or web address]  Prounouns: she/her    "

## 2019-07-15 NOTE — LETTER
"7/15/2019       RE: Nadia Toro  [address] [facility] [address]     Dear Colleague,    Thank you for referring your patient, Nadia Toro, to the [facility] VOICE at [facility]. Please see a copy of my visit note below.    [facility] VOICE CLINIC  THERAPY NOTE (CPT 31835)    Patient: Nadia Toro  Date of Service: 7/15/2019  Date of Service: 6/26/2019  Referring physician: Dr. Ramos, in conjunction with evaluation by my colleague, Mr. Ayo Martines  Impressions from most recent evaluation (6/24/19):  \"IMPRESSIONS: Nadia Toro is presenting today with F45.8 (Globus Sensation) and R07.0 (Throat Pain) in the context of J38.7 (Irritable Larynx Syndrome) and an imbalance in function of the intrinsic and extrinsic muscles of the larynx.  Laryngeal evaluation demonstrates essentially healthy laryngeal and vocal fold mucosa without focal lesions or abnormalities, but significant discoordination of laryngeal movement and associated increase in dysphonia (R 49.0).  Laryngeal hyperfunction visualized on exam corresponds to areas of tenderness on palpation and patient reported symptoms of throat discomfort and globus.\"    SUBJECTIVE:  Since her last session, Ms. Toro reports the following:     Overall she reports that symptoms are somewhat improved    Since 2015 she has experienced her symptoms, and she is concerned that she may not be able to improve them.    She is also concerned about how hypothyroidism and TMD may play a part in her symptoms    Successes: tongue exercises (reading is less helpful) have been helpful.    She recently purchased a thera-cane to help relieve pain an discomfort.    OBJECTIVE:  Ms. Toro presents today with the following:  LARYNGEAL PALPATION:   ? tenderness of the thyrohyoid area  ? reduced thyrohyoid space   ? Patient states that palpation of the thyrohyoid space is consistent with some aspects of her throat " "discomfort/globus     GLOBUS SENSATION: 5/10 (10 = most severe/ bothersome).     PATIENT REPORTED MEASURES:  Patient Supplied Answers To SLP QOL Questionnaire  Therapy Quality of Life 7/14/2019   Since my l ast session, I used the speech therapy exercises and strategies as recommended by my speech pathologist. Agree   I feel that using my therapy techniques has become a habit Agree   I feel confident in my ability to manage my current and future symptoms. Agree   Since my last session I feel my symptoms have --------. Improved   Overall, since starting therapy I feel my symptoms are --------. Better   Overall, how much better? Somewhat better     THERAPEUTIC ACTIVITIES    Demonstrated previous exercises.  o demonstrated improved technique  o appropriate redirection provided  o instruction provided for increased level of complexity/difficulty    Exercises and techniques for optimal vocal hygiene including:    Systemic hydration, including strategies for increasing daily water intake    Topical hydration - Gargling (saline and plain water), saline nasal irrigation, humidification, steam, guaifenesin to reduce the thickened secretions / laryngeal irritation.    [place] exercises for upper body relaxation during phonation.  o demonstrated moderate upper body tension  o good self-awareness while completing stretches for the upper body and neck.  o improvement in voice quality during exercises    Semi-Occluded Vocal Tract (SOVT) exercises instructed to reduce laryngeal tension, promote vocal fold pliability, and coordinate respiration and phonation    Straw phonation with water resistance was found to be most facilitating     Sustained phonation, and voice vs. voiceless productions used to promote easy voicing and raise awareness of laryngeal tension    Ascending and descending glides utilized to promote vocal fold pliability    \"Messa di voce\", gradual crescendo and decrescendo to vary medial compression was also " utilized to promote vocal fold pliability.    Instructed on the benefits of using these exercises for improved coordination of breath flow with phonation and tissue mobilization.    Instructed on the importance of using these exercises as a warm-up / cool down,  and to re-calibrate the voice throughout the day.    Counseling and Education:    Asked many questions about the nature of her symptoms, and I answered all of these thoroughly.    I provided an after visit summary and handouts of today's therapeutic activities to facilitate practice.    ASSESSMENT/PLAN  PROGRESS TOWARD LONG TERM GOALS:   Adequate progress; too early for objective measures    IMPRESSIONS: F45.8 (Globus Sensation) and R07.0 (Throat Pain) in the context of J38.7 (Irritable Larynx Syndrome) and an imbalance in function of the intrinsic and extrinsic muscles of the larynx.  She demonstrated good learning of the therapeutic activities learned today to reduce intrinsic and extrinsic neck tension, as well as possible laryngeal irritation.     PLAN: I will see Ms. Toro in mid-August weeks, at which point we will continue therapy and also work on gag suppression.   For practice goals see AVS.     TOTAL SERVICE TIME: 60 minutes  TREATMENT (31826): 60 minutes  NO CHARGE FACILITY FEE (55760)    Nica Gaming M.M. (voice), M.A., CCC/SLP  Speech-Language Pathologist  Certificate of Vocology  [facility]  [phone]  [email or web address]  Prounouns: she/her    Again, thank you for allowing me to participate in the care of your patient.      Sincerely,    Nica Gaming, SLP

## 2019-07-15 NOTE — PROGRESS NOTES
"[facility] VOICE CLINIC  THERAPY NOTE (CPT 05946)    Patient: Nadia Toro  Date of Service: 7/15/2019  Date of Service: 6/26/2019  Referring physician: Dr. Ramos, in conjunction with evaluation by my colleague,  Ayo Conrad  Impressions from most recent evaluation (6/24/19):  \"IMPRESSIONS: Nadia Toro is presenting today with F45.8 (Globus Sensation) and R07.0 (Throat Pain) in the context of J38.7 (Irritable Larynx Syndrome) and an imbalance in function of the intrinsic and extrinsic muscles of the larynx.  Laryngeal evaluation demonstrates essentially healthy laryngeal and vocal fold mucosa without focal lesions or abnormalities, but significant discoordination of laryngeal movement and associated increase in dysphonia (R 49.0).  Laryngeal hyperfunction visualized on exam corresponds to areas of tenderness on palpation and patient reported symptoms of throat discomfort and globus.\"    SUBJECTIVE:  Since her last session, Ms. Toro reports the following:     Overall she reports that symptoms are somewhat improved    Since 2015 she has experienced her symptoms, and she is concerned that she may not be able to improve them.    She is also concerned about how hypothyroidism and TMD may play a part in her symptoms    Successes: tongue exercises (reading is less helpful) have been helpful.    She recently purchased a thera-cane to help relieve pain an discomfort.    OBJECTIVE:  Ms. Toro presents today with the following:  LARYNGEAL PALPATION:   ? tenderness of the thyrohyoid area  ? reduced thyrohyoid space   ? Patient states that palpation of the thyrohyoid space is consistent with some aspects of her throat discomfort/globus     GLOBUS SENSATION: 5/10 (10 = most severe/ bothersome).     PATIENT REPORTED MEASURES:  Patient Supplied Answers To SLP QOL Questionnaire  Therapy Quality of Life 7/14/2019   Since my l ast session, I used the speech therapy exercises and strategies as recommended by " "my speech pathologist. Agree   I feel that using my therapy techniques has become a habit Agree   I feel confident in my ability to manage my current and future symptoms. Agree   Since my last session I feel my symptoms have --------. Improved   Overall, since starting therapy I feel my symptoms are --------. Better   Overall, how much better? Somewhat better     THERAPEUTIC ACTIVITIES    Demonstrated previous exercises.  o demonstrated improved technique  o appropriate redirection provided  o instruction provided for increased level of complexity/difficulty    Exercises and techniques for optimal vocal hygiene including:    Systemic hydration, including strategies for increasing daily water intake    Topical hydration - Gargling (saline and plain water), saline nasal irrigation, humidification, steam, guaifenesin to reduce the thickened secretions / laryngeal irritation.    [place] exercises for upper body relaxation during phonation.  o demonstrated moderate upper body tension  o good self-awareness while completing stretches for the upper body and neck.  o improvement in voice quality during exercises    Semi-Occluded Vocal Tract (SOVT) exercises instructed to reduce laryngeal tension, promote vocal fold pliability, and coordinate respiration and phonation    Straw phonation with water resistance was found to be most facilitating     Sustained phonation, and voice vs. voiceless productions used to promote easy voicing and raise awareness of laryngeal tension    Ascending and descending glides utilized to promote vocal fold pliability    \"Messa di voce\", gradual crescendo and decrescendo to vary medial compression was also utilized to promote vocal fold pliability.    Instructed on the benefits of using these exercises for improved coordination of breath flow with phonation and tissue mobilization.    Instructed on the importance of using these exercises as a warm-up / cool down,  and to re-calibrate the voice " throughout the day.    Counseling and Education:    Asked many questions about the nature of her symptoms, and I answered all of these thoroughly.    I provided an after visit summary and handouts of today's therapeutic activities to facilitate practice.    ASSESSMENT/PLAN  PROGRESS TOWARD LONG TERM GOALS:   Adequate progress; too early for objective measures    IMPRESSIONS: F45.8 (Globus Sensation) and R07.0 (Throat Pain) in the context of J38.7 (Irritable Larynx Syndrome) and an imbalance in function of the intrinsic and extrinsic muscles of the larynx.  She demonstrated good learning of the therapeutic activities learned today to reduce intrinsic and extrinsic neck tension, as well as possible laryngeal irritation.     PLAN: I will see Ms. Toro in mid-August weeks, at which point we will continue therapy and also work on gag suppression.   For practice goals see AVS.     TOTAL SERVICE TIME: 60 minutes  TREATMENT (89005): 60 minutes  NO CHARGE FACILITY FEE (60629)    Nica Gaming M.M. (voice), M.A., CCC/SLP  Speech-Language Pathologist  Certificate of Vocology  [facility]  [phone]  [email or web address]  Prounouns: she/her

## 2019-08-14 ENCOUNTER — OFFICE VISIT (OUTPATIENT)
Dept: OTOLARYNGOLOGY | Facility: CLINIC | Age: 59
End: 2019-08-14
Payer: COMMERCIAL

## 2019-08-14 DIAGNOSIS — J38.7 IRRITABLE LARYNX SYNDROME: ICD-10-CM

## 2019-08-14 DIAGNOSIS — R09.A2 GLOBUS SENSATION: ICD-10-CM

## 2019-08-14 DIAGNOSIS — R49.0 DYSPHONIA: Primary | ICD-10-CM

## 2019-08-14 DIAGNOSIS — R07.0 THROAT DISCOMFORT: ICD-10-CM

## 2019-08-14 DIAGNOSIS — R05.3 CHRONIC COUGH: ICD-10-CM

## 2019-08-14 NOTE — PROGRESS NOTES
"  [facility] VOICE CLINIC  THERAPY NOTE (CPT 25834)    Patient: Nadia Toro  Date of Service: 8/14/2019  Referring physician: Dr. Ramos, in conjunction with evaluation by my colleague, Mr. Ayo Martines  Impressions from most recent evaluation (6/24/19):  \"IMPRESSIONS: Nadia Toro is presenting today with F45.8 (Globus Sensation) and R07.0 (Throat Pain) in the context of J38.7 (Irritable Larynx Syndrome) and an imbalance in function of the intrinsic and extrinsic muscles of the larynx.  Laryngeal evaluation demonstrates essentially healthy laryngeal and vocal fold mucosa without focal lesions or abnormalities, but significant discoordination of laryngeal movement and associated increase in dysphonia (R 49.0).  Laryngeal hyperfunction visualized on exam corresponds to areas of tenderness on palpation and patient reported symptoms of throat discomfort and globus.\"       SUBJECTIVE:  Since her last session, Ms. Toro reports the following:     Overall she reports that symptoms are improving, and she is experiencing less tension and squeezing while swallowing    She notices the benefits    Work with Rachana has been helpful. She has had a left sided \"lump\" on the left behind the jaw bone, and Rachana has been able to complete some good work.    thera-cane was not recommended by Rachana, but a tennis ball is better.    Dry foods  - first bite can be problematic.    OBJECTIVE:  Ms. Toro presents today with the following:  LARYNGEAL PALPATION:   ? Moderate tightness and tenderness of the thyrohyoid area  ? reduced thyrohyoid space   ? Patient states that palpation of the thyrohyoid space is consistent with some aspects of her throat discomfort/globus     GLOBUS SENSATION: 5/10 (10 = most severe/ bothersome). Feels obstructive and balloons out with strong smells, etc.  Today, she feels sandpaper at the bottom left base of tongue attachment -less now that she is completing tongue stretches.  Now when she swallows " she feels a squeezing of the epiglottis against the posterior pharyngeal wall.     VOICE:  ? Roughness: Minimal  ? Breathiness: minimal  ? Strain: Minimal Intermittent  ? Loudness    Conversational speech:  WNL  ? Pitch:    Conversational speech:  WNL    Pitch glide:     Significant and coordination during the exam; however, following clinician cue patient was able to demonstrate adequate access to [employer] register with no notable breaks      PATIENT REPORTED MEASURES:  Patient Supplied Answers To SLP QOL Questionnaire  Therapy Quality of Life 7/14/2019   Since my l ast session, I used the speech therapy exercises and strategies as recommended by my speech pathologist. Agree   I feel that using my therapy techniques has become a habit Agree   I feel confident in my ability to manage my current and future symptoms. Agree   Since my last session I feel my symptoms have --------. Improved   Overall, since starting therapy I feel my symptoms are --------. Better   Overall, how much better? Somewhat better     THERAPEUTIC ACTIVITIES    Demonstrated previous exercises.  o demonstrated improved technique  o appropriate redirection provided  o instruction provided for increased level of complexity/difficulty    Swallow strategies learned to help reduce additional throat tension and effort   - I recommended adding moisture to foods  - learned additional safe swallow strategies to help support a safe and independent swallow.  - she noted today that it feels like she needs to stretch her soft palate and she would push up with her thumb.     [place] exercises for upper body relaxation during phonation.  o demonstrated moderate upper body tension  o good self-awareness while completing stretches for the upper body and neck.  o improvement in voice quality during exercises  o Left side of the body feels tighter; left shoulder tends to curve inward.  o She will continue to work with the clinical massage therapist that I recommended  "she see; Ms. Rachana Kernramo.    Exercises and techniques for optimal vocal hygiene including:    Systemic hydration, including strategies for increasing daily water intake    Topical hydration - Gargling (saline and plain water), saline nasal irrigation, humidification, steam, guaifenesin to reduce the thickened secretions / laryngeal irritation.    Semi-Occluded Vocal Tract (SOVT) exercises instructed to reduce laryngeal tension, promote vocal fold pliability, and coordinate respiration and phonation    Straw phonation with water resistance was found to be most facilitating     Sustained phonation, and voice vs. voiceless productions used to promote easy voicing and raise awareness of laryngeal tension    Ascending and descending glides utilized to promote vocal fold pliability    \"Messa di voce\", gradual crescendo and decrescendo to vary medial compression was also utilized to promote vocal fold pliability.    Instructed on the importance of using these exercises as a warm-up / cool down,  and to re-calibrate the voice throughout the day.    Counseling and Education:    Asked many questions about the nature of her symptoms, and I answered all of these thoroughly.    I provided an audio recording and handouts of today's therapeutic activities to facilitate practice.    ASSESSMENT/PLAN  PROGRESS TOWARD LONG TERM GOALS:   Adequate progress; too early for objective measures    IMPRESSIONS: F45.8 (Globus Sensation) and R07.0 (Throat Pain) in the context of J38.7 (Irritable Larynx Syndrome) and an imbalance in function of the intrinsic and extrinsic muscles of the larynx. Ms. Toro reports that her symptoms continue to steadily improve.  I will see her again in 4 weeks, unless I have a cancellation.    PLAN: I will see Ms. Toro in 4 weeks, at which point we will continue therapy.   For practice goals see AVS.     TOTAL SERVICE TIME: 60 minutes  TREATMENT (44191): 60 minutes  NO CHARGE FACILITY FEE " (68905)    Nica Gaming M.M. (voice), M.A., CCC/SLP  Speech-Language Pathologist  Certificate of Vocology  [facility]  [phone]  [email or web address]  Prounouns: she/her

## 2019-08-14 NOTE — LETTER
Date:August 23, 2019      Patient was self referred, no letter generated. Do not send.        [facility] Health Information

## 2019-08-14 NOTE — PATIENT INSTRUCTIONS
After Visit Summary    Patient: Nadia Toro  Date of Visit: 8/14/2019            *Continue gargling regularly, and discontinue all nasal irrigation and spray.  If you catch a cold, or allergy flare up, then try the saline spray.    *Increase sipping for energy and to improve overall hydration.  It can take several hours for the water you drink to help you systemically.    - check flavor and temperature to help you drink more.    - drink till you pee pale.    Relieving Extrinsic Muscle Discomfort:    Stretches  o Please follow instructions as modified today and provided on handout  - Make sure to also stretch with arms behind the back, to help stretch the extrinsic neck muscles.      Massage (continue)    Breathing:    In the morning and evening (twice daily) for 2-5 minutes:   o Breathe while lying on your back with your face and knees up. Hands on tummy and chest.  Take a breath in with rounded lips and exhale with a  shhh    o Inhale  = Inflate; exhale = deflate  o Throughout the day (2-3x/day for just a couple minutes) check breathing while keeping shoulders relaxed (riding to and from school, etc.)    Voice (2-3x/day unless otherwise noted):    Semi-occluded vocal tract exercise:   o Bubbles (straw in 1 to 1.5  of water) 4-6x/day for 1 min:  o 3x: blow bubbles and add a sustained  who  or an  oo  (comfortable pitch)  o 3x: blow bubbles and vary  who  gliding up and down             Up and down like a sine wave  o 3x: blow bubbles on a sustained/ varied pitch soft to loud to soft (messa di voce)    o 1-2x: Happy birthday bubbles (keep connected)  These exercises are great for:    *warm up / cool down - Part of the morning routing and before and after extended voice use.    *tissue mobilization exercise - Improving the condition and pliability of the vocal folds.    *Abdominal breathing and applying optimal breath flow to speech/singing.     Nica Gaming M.M. (voice), M.A., CCC/SLP  Speech-Language  Pathologist  Certificate of Vocology  [facility]  [phone]  [email or web address]  Prounouns: she/her

## 2019-08-14 NOTE — LETTER
"8/14/2019       RE: Nadia Toro  [address] [place] [address]     Dear Colleague,    Thank you for referring your patient, Nadia Toro, to the [facility] VOICE at [facility]. Please see a copy of my visit note below.      [facility] VOICE CLINIC  THERAPY NOTE (CPT 66162)    Patient: Nadia Toro  Date of Service: 8/14/2019  Referring physician: Dr. Ramos, in conjunction with evaluation by my colleague, Mr. Ayo Martines  Impressions from most recent evaluation (6/24/19):  \"IMPRESSIONS: Nadia Toro is presenting today with F45.8 (Globus Sensation) and R07.0 (Throat Pain) in the context of J38.7 (Irritable Larynx Syndrome) and an imbalance in function of the intrinsic and extrinsic muscles of the larynx.  Laryngeal evaluation demonstrates essentially healthy laryngeal and vocal fold mucosa without focal lesions or abnormalities, but significant discoordination of laryngeal movement and associated increase in dysphonia (R 49.0).  Laryngeal hyperfunction visualized on exam corresponds to areas of tenderness on palpation and patient reported symptoms of throat discomfort and globus.\"       SUBJECTIVE:  Since her last session, Ms. Toro reports the following:     Overall she reports that symptoms are improving, and she is experiencing less tension and squeezing while swallowing    She notices the benefits    Work with Rachana has been helpful. She has had a left sided \"lump\" on the left behind the jaw bone, and Rachana has been able to complete some good work.    thera-cane was not recommended by Rachana, but a tennis ball is better.    Dry foods  - first bite can be problematic.    OBJECTIVE:  Ms. Toro presents today with the following:  LARYNGEAL PALPATION:   ? Moderate tightness and tenderness of the thyrohyoid area  ? reduced thyrohyoid space   ? Patient states that palpation of the thyrohyoid space is consistent with some aspects of her throat " discomfort/globus     GLOBUS SENSATION: 5/10 (10 = most severe/ bothersome). Feels obstructive and balloons out with strong smells, etc.  Today, she feels sandpaper at the bottom left base of tongue attachment -less now that she is completing tongue stretches.  Now when she swallows she feels a squeezing of the epiglottis against the posterior pharyngeal wall.     VOICE:  ? Roughness: Minimal  ? Breathiness: minimal  ? Strain: Minimal Intermittent  ? Loudness    Conversational speech:  WNL  ? 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 Agree   I feel that using my therapy techniques has become a habit Agree   I feel confident in my ability to manage my current and future symptoms. Agree   Since my last session I feel my symptoms have --------. Improved   Overall, since starting therapy I feel my symptoms are --------. Better   Overall, how much better? Somewhat better     THERAPEUTIC ACTIVITIES    Demonstrated previous exercises.  o demonstrated improved technique  o appropriate redirection provided  o instruction provided for increased level of complexity/difficulty    Swallow strategies learned to help reduce additional throat tension and effort   - I recommended adding moisture to foods  - learned additional safe swallow strategies to help support a safe and independent swallow.  - she noted today that it feels like she needs to stretch her soft palate and she would push up with her thumb.     [place] exercises for upper body relaxation during phonation.  o demonstrated moderate upper body  "tension  o good self-awareness while completing stretches for the upper body and neck.  o improvement in voice quality during exercises  o Left side of the body feels tighter; left shoulder tends to curve inward.  o She will continue to work with the clinical massage therapist that I recommended she see; Ms. Rachana Geller.    Exercises and techniques for optimal vocal hygiene including:    Systemic hydration, including strategies for increasing daily water intake    Topical hydration - Gargling (saline and plain water), saline nasal irrigation, humidification, steam, guaifenesin to reduce the thickened secretions / laryngeal irritation.    Semi-Occluded Vocal Tract (SOVT) exercises instructed to reduce laryngeal tension, promote vocal fold pliability, and coordinate respiration and phonation    Straw phonation with water resistance was found to be most facilitating     Sustained phonation, and voice vs. voiceless productions used to promote easy voicing and raise awareness of laryngeal tension    Ascending and descending glides utilized to promote vocal fold pliability    \"Messa di voce\", gradual crescendo and decrescendo to vary medial compression was also utilized to promote vocal fold pliability.    Instructed on the importance of using these exercises as a warm-up / cool down,  and to re-calibrate the voice throughout the day.    Counseling and Education:    Asked many questions about the nature of her symptoms, and I answered all of these thoroughly.    I provided an audio recording and handouts of today's therapeutic activities to facilitate practice.    ASSESSMENT/PLAN  PROGRESS TOWARD LONG TERM GOALS:   Adequate progress; too early for objective measures    IMPRESSIONS: F45.8 (Globus Sensation) and R07.0 (Throat Pain) in the context of J38.7 (Irritable Larynx Syndrome) and an imbalance in function of the intrinsic and extrinsic muscles of the larynx. Ms. Toro reports that her symptoms continue to " steadily improve.  I will see her again in 4 weeks, unless I have a cancellation.    PLAN: I will see Ms. Toro in 4 weeks, at which point we will continue therapy.   For practice goals see AVS.     TOTAL SERVICE TIME: 60 minutes  TREATMENT (26221): 60 minutes  NO CHARGE FACILITY FEE (16417)    Nica Gaming M.M. (voice), M.A., CCC/SLP  Speech-Language Pathologist  Certificate of Vocology  [facility]  [phone]  [email or web address]  Prounouns: she/her    Again, thank you for allowing me to participate in the care of your patient.      Sincerely,    Nica Gaming, SLP

## 2019-10-01 ENCOUNTER — DOCUMENTATION ONLY (OUTPATIENT)
Dept: CARE COORDINATION | Facility: CLINIC | Age: 59
End: 2019-10-01

## 2019-10-09 ENCOUNTER — OFFICE VISIT (OUTPATIENT)
Dept: OTOLARYNGOLOGY | Facility: CLINIC | Age: 59
End: 2019-10-09
Payer: COMMERCIAL

## 2019-10-09 DIAGNOSIS — R09.A2 GLOBUS SENSATION: ICD-10-CM

## 2019-10-09 DIAGNOSIS — R49.0 DYSPHONIA: Primary | ICD-10-CM

## 2019-10-09 DIAGNOSIS — R05.3 CHRONIC COUGH: ICD-10-CM

## 2019-10-09 DIAGNOSIS — J38.7 IRRITABLE LARYNX SYNDROME: ICD-10-CM

## 2019-10-09 DIAGNOSIS — R07.0 THROAT DISCOMFORT: ICD-10-CM

## 2019-10-09 NOTE — LETTER
"10/9/2019       RE: Nadia Toro  [address]  [place] [address]     Dear Colleague,    Thank you for referring your patient, Nadia Toro, to the [facility]. Please see a copy of my visit note below.      [facility] VOICE CLINIC  THERAPY NOTE (CPT 77536)    Patient: Nadia Toro  Date of Service: 10/9/2019  Referring physician: Dr. Ramos, in conjunction with evaluation by my colleague, Mr. Ayo Martines  Impressions from most recent evaluation (6/24/19):  \"IMPRESSIONS: Nadia Toro is presenting today with F45.8 (Globus Sensation) and R07.0 (Throat Pain) in the context of J38.7 (Irritable Larynx Syndrome) and an imbalance in function of the intrinsic and extrinsic muscles of the larynx.  Laryngeal evaluation demonstrates essentially healthy laryngeal and vocal fold mucosa without focal lesions or abnormalities, but significant discoordination of laryngeal movement and associated increase in dysphonia (R 49.0).  Laryngeal hyperfunction visualized on exam corresponds to areas of tenderness on palpation and patient reported symptoms of throat discomfort and globus.\"    SUBJECTIVE:  Since her last session, Ms. Toro reports the following:     Overall she reports that symptoms are slowly improving    OBJECTIVE:  Ms. Toro presents today with the following:  LARYNGEAL PALPATION:   ? Mild to moderate tightness and tenderness of the thyrohyoid area  ? reduced thyrohyoid space   ? Patient states that palpation of the thyrohyoid space is consistent with some aspects of her throat discomfort/globus     GLOBUS SENSATION: 7/10 (10 = most severe/ bothersome).    VOICE:  ? Roughness: Minimal  ? Breathiness: minimal  ? Strain: Minimal Intermittent  ? Loudness    Conversational speech:  WNL  ? Pitch:    Conversational speech:  WNL    PATIENT REPORTED MEASURES:  Patient Supplied Answers To SLP QOL Questionnaire  Therapy Quality of Life " 7/14/2019   Since my l ast session, I used the speech therapy exercises and strategies as recommended by my speech pathologist. Agree   I feel that using my therapy techniques has become a habit Agree   I feel confident in my ability to manage my current and future symptoms. Agree   Since my last session I feel my symptoms have --------. Improved   Overall, since starting therapy I feel my symptoms are --------. Better   Overall, how much better? Somewhat better     Speech follow up as discussed with patient:  Dysponia SLP Goals 10/9/2019   How would you rate your speaking voice quality, if 0 is worst voice quality, and 10 is best voice? 7   How how severe is your [Throat Discomfort - or use patient specific description], if 0 is no [discomfort] at all and 10 is the worst [discomfort]? 7   How severe is your cough /throat clearing, if 0 is no cough at all and 10 is the worst cough? 4   How much does your throat discomfort bother you?     Very Much     THERAPEUTIC ACTIVITIES    Demonstrated previous exercises.  o demonstrated improved technique  o appropriate redirection provided  o instruction provided for increased level of complexity/difficulty    Counseling and Education:    Asked many questions about the nature of her symptoms, and I answered all of these thoroughly.    I provided recommendations for guards to help reduce bruxism, which could be contributing to her neck discomfort.    We discussed and she agreed that she could find benefit from pursuing physical therapy with myofascial release.    A revised regimen for home practice was instructed.    I provided handouts of today's therapeutic activities to facilitate practice.    ASSESSMENT/PLAN  PROGRESS TOWARD LONG TERM GOALS:   Adequate progress; please see above    IMPRESSIONS:F45.8 (Globus Sensation) and R07.0 (Throat Pain) in the context of J38.7 (Irritable Larynx Syndrome) and an imbalance in function of the intrinsic and extrinsic muscles of the larynx.  Acceptable progress with the modifications provided today    PLAN: I will see Ms. Toro in April 2020.  She is feeling overwhelmed with other appointments.  We agreed that she would contact me if she experiences a major set back, but this will allow her more time to pursue physical therapy.  For practice goals see AVS.     TOTAL SERVICE TIME: 60 minutes  TREATMENT (71829): 60 minutes  NO CHARGE FACILITY FEE (79562)    Nica Gaming M.M. (voice), M.A., CCC/SLP  Speech-Language Pathologist  Certificate of Vocology  [facility]  [phone]  [email or web address]  Prounouns: she/her    Again, thank you for allowing me to participate in the care of your patient.      Sincerely,    Nica Gaming, SLP

## 2019-10-09 NOTE — PATIENT INSTRUCTIONS
Return: 4/1/2020 - 2pm    Touch base with Nica in January - do you want to start PT with myofascial release?        Continue base of tongue stretches, massage, using warm and cool compresses to affected areas (1-2x/day 3 min cool, 5 min warm, and repeat 2-3x).    Nica Gaming M.M. (voice), MMalaikaA., CCC/SLP  Speech-Language Pathologist  [facility]  [phone]

## 2019-10-09 NOTE — LETTER
Date:October 18, 2019      Patient was self referred, no letter generated. Do not send.        [facility] Health Information

## 2019-10-09 NOTE — PROGRESS NOTES
"  [facility] VOICE CLINIC  THERAPY NOTE (CPT 98192)    Patient: Nadia Toro  Date of Service: 10/9/2019  Referring physician: Dr. Ramos, in conjunction with evaluation by my colleague,  Ayo Martines  Impressions from most recent evaluation (6/24/19):  \"IMPRESSIONS: Nadia Toro is presenting today with F45.8 (Globus Sensation) and R07.0 (Throat Pain) in the context of J38.7 (Irritable Larynx Syndrome) and an imbalance in function of the intrinsic and extrinsic muscles of the larynx.  Laryngeal evaluation demonstrates essentially healthy laryngeal and vocal fold mucosa without focal lesions or abnormalities, but significant discoordination of laryngeal movement and associated increase in dysphonia (R 49.0).  Laryngeal hyperfunction visualized on exam corresponds to areas of tenderness on palpation and patient reported symptoms of throat discomfort and globus.\"    SUBJECTIVE:  Since her last session, Ms. Toro reports the following:     Overall she reports that symptoms are slowly improving    OBJECTIVE:  Ms. Toro presents today with the following:  LARYNGEAL PALPATION:   ? Mild to moderate tightness and tenderness of the thyrohyoid area  ? reduced thyrohyoid space   ? Patient states that palpation of the thyrohyoid space is consistent with some aspects of her throat discomfort/globus     GLOBUS SENSATION: 7/10 (10 = most severe/ bothersome).    VOICE:  ? Roughness: Minimal  ? Breathiness: minimal  ? Strain: Minimal Intermittent  ? Loudness    Conversational speech:  WNL  ? Pitch:    Conversational speech:  WNL    PATIENT REPORTED MEASURES:  Patient Supplied Answers To SLP QOL Questionnaire  Therapy Quality of Life 7/14/2019   Since my l ast session, I used the speech therapy exercises and strategies as recommended by my speech pathologist. Agree   I feel that using my therapy techniques has become a habit Agree   I feel confident in my ability to manage my current and future symptoms. Agree "   Since my last session I feel my symptoms have --------. Improved   Overall, since starting therapy I feel my symptoms are --------. Better   Overall, how much better? Somewhat better     Speech follow up as discussed with patient:  Dysponia SLP Goals 10/9/2019   How would you rate your speaking voice quality, if 0 is worst voice quality, and 10 is best voice? 7   How how severe is your [Throat Discomfort - or use patient specific description], if 0 is no [discomfort] at all and 10 is the worst [discomfort]? 7   How severe is your cough /throat clearing, if 0 is no cough at all and 10 is the worst cough? 4   How much does your throat discomfort bother you?     Very Much     THERAPEUTIC ACTIVITIES    Demonstrated previous exercises.  o demonstrated improved technique  o appropriate redirection provided  o instruction provided for increased level of complexity/difficulty    Counseling and Education:    Asked many questions about the nature of her symptoms, and I answered all of these thoroughly.    I provided recommendations for guards to help reduce bruxism, which could be contributing to her neck discomfort.    We discussed and she agreed that she could find benefit from pursuing physical therapy with myofascial release.    A revised regimen for home practice was instructed.    I provided handouts of today's therapeutic activities to facilitate practice.    ASSESSMENT/PLAN  PROGRESS TOWARD LONG TERM GOALS:   Adequate progress; please see above    IMPRESSIONS:F45.8 (Globus Sensation) and R07.0 (Throat Pain) in the context of J38.7 (Irritable Larynx Syndrome) and an imbalance in function of the intrinsic and extrinsic muscles of the larynx. Acceptable progress with the modifications provided today    PLAN: I will see Ms. Toro in April 2020.  She is feeling overwhelmed with other appointments.  We agreed that she would contact me if she experiences a major set back, but this will allow her more time to pursue  physical therapy.  For practice goals see AVS.     TOTAL SERVICE TIME: 60 minutes  TREATMENT (03120): 60 minutes  NO CHARGE FACILITY FEE (30252)    Nica Gaming M.M. (voice), M.A., CCC/SLP  Speech-Language Pathologist  Certificate of Vocology  [facility] Voice [facility]  [phone]  [email or web address]  Prounouns: she/her

## 2020-03-11 ENCOUNTER — HEALTH MAINTENANCE LETTER (OUTPATIENT)
Age: 60
End: 2020-03-11

## 2020-07-01 ENCOUNTER — TELEPHONE (OUTPATIENT)
Dept: OTOLARYNGOLOGY | Facility: CLINIC | Age: 60
End: 2020-07-01

## 2020-07-01 NOTE — TELEPHONE ENCOUNTER
LVM letting pt know due to COVID-19 protocol provider will not be seeing patients in clinic at this time. Offered return video visit via [employer] for appt on 7/15 with provider. Left call center number for conversion/rescheduling.    Pt is welcome to do telephone appt if unable to facilitate a video appt.    Clinic is unsure when in-person appts can resume so virtual visit is best for prompt care. If pt would prefer to wait to be seen in clinic please reschedule to mid-Aug or later with subject to change based on COVID-19 protocol.

## 2021-01-03 ENCOUNTER — HEALTH MAINTENANCE LETTER (OUTPATIENT)
Age: 61
End: 2021-01-03

## 2021-04-25 ENCOUNTER — HEALTH MAINTENANCE LETTER (OUTPATIENT)
Age: 61
End: 2021-04-25

## 2021-10-10 ENCOUNTER — HEALTH MAINTENANCE LETTER (OUTPATIENT)
Age: 61
End: 2021-10-10

## 2022-05-21 ENCOUNTER — HEALTH MAINTENANCE LETTER (OUTPATIENT)
Age: 62
End: 2022-05-21

## 2022-09-18 ENCOUNTER — HEALTH MAINTENANCE LETTER (OUTPATIENT)
Age: 62
End: 2022-09-18

## 2023-01-29 ENCOUNTER — HEALTH MAINTENANCE LETTER (OUTPATIENT)
Age: 63
End: 2023-01-29

## 2023-06-04 ENCOUNTER — HEALTH MAINTENANCE LETTER (OUTPATIENT)
Age: 63
End: 2023-06-04

## 2023-11-17 ENCOUNTER — LAB REQUISITION (OUTPATIENT)
Dept: LAB | Facility: CLINIC | Age: 63
End: 2023-11-17
Payer: COMMERCIAL

## 2023-11-17 DIAGNOSIS — Z12.4 ENCOUNTER FOR SCREENING FOR MALIGNANT NEOPLASM OF CERVIX: ICD-10-CM

## 2023-11-17 PROCEDURE — 87624 HPV HI-RISK TYP POOLED RSLT: CPT | Mod: ORL | Performed by: OBSTETRICS & GYNECOLOGY

## 2023-11-17 PROCEDURE — G0145 SCR C/V CYTO,THINLAYER,RESCR: HCPCS | Mod: ORL | Performed by: OBSTETRICS & GYNECOLOGY

## 2023-11-21 LAB
BKR LAB AP GYN ADEQUACY: NORMAL
BKR LAB AP GYN INTERPRETATION: NORMAL
BKR LAB AP HPV REFLEX: NORMAL
BKR LAB AP LMP: NORMAL
BKR LAB AP PREVIOUS ABNL DX: NORMAL
BKR LAB AP PREVIOUS ABNORMAL: NORMAL
PATH REPORT.COMMENTS IMP SPEC: NORMAL
PATH REPORT.COMMENTS IMP SPEC: NORMAL
PATH REPORT.RELEVANT HX SPEC: NORMAL

## 2023-11-24 LAB
HUMAN PAPILLOMA VIRUS 16 DNA: NEGATIVE
HUMAN PAPILLOMA VIRUS 18 DNA: NEGATIVE
HUMAN PAPILLOMA VIRUS FINAL DIAGNOSIS: NORMAL
HUMAN PAPILLOMA VIRUS OTHER HR: NEGATIVE

## 2023-12-17 ENCOUNTER — HEALTH MAINTENANCE LETTER (OUTPATIENT)
Age: 63
End: 2023-12-17